# Patient Record
Sex: MALE | Race: OTHER | HISPANIC OR LATINO | URBAN - METROPOLITAN AREA
[De-identification: names, ages, dates, MRNs, and addresses within clinical notes are randomized per-mention and may not be internally consistent; named-entity substitution may affect disease eponyms.]

---

## 2024-04-18 ENCOUNTER — EMERGENCY (EMERGENCY)
Facility: HOSPITAL | Age: 24
LOS: 1 days | Discharge: HOME | End: 2024-04-18
Attending: STUDENT IN AN ORGANIZED HEALTH CARE EDUCATION/TRAINING PROGRAM | Admitting: STUDENT IN AN ORGANIZED HEALTH CARE EDUCATION/TRAINING PROGRAM
Payer: SELF-PAY

## 2024-04-18 VITALS
OXYGEN SATURATION: 97 % | RESPIRATION RATE: 17 BRPM | HEART RATE: 93 BPM | DIASTOLIC BLOOD PRESSURE: 74 MMHG | TEMPERATURE: 98 F | SYSTOLIC BLOOD PRESSURE: 129 MMHG

## 2024-04-18 DIAGNOSIS — M25.561 PAIN IN RIGHT KNEE: ICD-10-CM

## 2024-04-18 DIAGNOSIS — G89.11 ACUTE PAIN DUE TO TRAUMA: ICD-10-CM

## 2024-04-18 DIAGNOSIS — M25.511 PAIN IN RIGHT SHOULDER: ICD-10-CM

## 2024-04-18 DIAGNOSIS — M54.9 DORSALGIA, UNSPECIFIED: ICD-10-CM

## 2024-04-18 DIAGNOSIS — M79.671 PAIN IN RIGHT FOOT: ICD-10-CM
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
## 2024-04-18 PROCEDURE — 99283 EMERGENCY DEPT VISIT LOW MDM: CPT

## 2024-04-18 PROCEDURE — 99284 EMERGENCY DEPT VISIT MOD MDM: CPT
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
## 2024-04-18 NOTE — ED PROVIDER NOTE - NS ED ROS FT
Positive: Right shoulder pain, right knee pain, right foot pain   negative: Fever, chills, hemoptysis, chest pain, shortness of breath, nausea, vomiting, diarrhea, abdominal pain, dysuria, hematuria, rash, focal numbness or weakness, head trauma, LOC
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
## 2024-04-18 NOTE — ED PROVIDER NOTE - BIRTH SEX
The shortness of breath is probably more from asthma. Your heart test looked excellent in 2020.  Try adding a twice a day inhaler fluticasone, 1-2 sprays twice day. Take twice a day every day. You can still use the Xopenex inhaler here and there as needed.     Consider seeing ENT about tinnitus and hearing.     Diabetes looks great. Stop taking the diabetes pill (glipizide).     See me back in April 2023 for an Annual Wellness Visit.   
Male
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
## 2024-04-18 NOTE — ED PROVIDER NOTE - PHYSICAL EXAMINATION
GENERAL:  Awake, alert and in NAD, non-toxic appearing  ENMT: Airway patent, c-collar in place  EYES: conjunctiva clear, pupils 3 mm PERRL  CARDIAC: Regular rate, regular rhythm.  Heart sounds S1, S2, no S3, S4. No murmurs, rubs or gallops.  RESPIRATORY: Breath sounds clear and equal in bilateral anterior lung fields, no wheezes/ronchi/crackles/stridor; pt breathing and speaking comfortably with no increased WOB, no accessory mm. use, no intercostal retractions, no nasal flaring  GI: abdomen soft, non-distended, non-tender, no rebound or guarding in ruq/luq/rlq/llq/epigastrium  SKIN: warm and dry, no rashes/abrasions/lacerations  PSYCH: awake, alert, calm and cooperative  EXTREMITIES: no ble edema  BUE: 5/5 motor strength LUE deltoid, biceps, triceps, wrist flexors/extensors, hand . sensation intact to light touch bilat C5-T1; 2+ radial pulses bilat; compartment soft, skin warm and dry; limited ability to assess RUE strength 2/2 pain, movement limited 2/2 pain  BLE: 5/5 motor strength LLE hip flexors, knee flexors and extensors, plantar and dorsiflexors, hallux flexors and extensors. sensation intact to light touch bilat L3-S1; 2+ DP pulses bilat; compartment soft, skin warm and dry; movement of RLE and strength of RLE limited 2/2 pain  ttp over R foot dorsally, and generally over R knee and R shoulder. no deformities of R shoulder/knee/foot  NEURO: gcs 15  head: ncat, no raccoon eyes/gomes sign  spine: no midline c/t/l spinal ttp or stepoffs

## 2024-04-18 NOTE — ED PROVIDER NOTE - NSFOLLOWUPINSTRUCTIONS_ED_ALL_ED_FT
Please reach out to Monisha Marques ([facility] ED clinical referral coordinator) to assist you with your follow-up appointment.     Monday - Friday 11am-7pm  [phone]  [email or web address]     1. You were seen for shoulder and knee pain. A copy of any of your resulted labs, imaging, and findings have been provided to you. Make sure to view any test results that may not have yet resulted at the time of your discharge by creating a FollowMyHealth account at: [email or web address] to sign up for FollowMyHealth. Please review all of your lab, imaging, and all other results in their entirety with your primary care doctor.   2. Continue to take your home medications as prescribed. Take over the counter motrin 600 mg with food every six hours (do not exceed 3,200 mg in a 24 hour period) and supplement with tylenol 650 mg every six hours (do not exceed 4000 mg of tylenol in a 24 hour period and do not drink alcohol while taking tylenol) between the motrin dosages to have a layered effect. Consult a pharmacist or your primary care doctor with any questions.   3. Follow up with your primary care doctor within 48 hours to assess the symptoms you were seen for in the emergency department and to review all results from your visit. If you don't have a doctor, call [phone] to make an appointment.  4. Return immediately to the emergency department for new, persistent, or worsening symptoms or signs. Return immediately to the emergency department if you have chest pain, shortness of breath, loss of consciousness, numbness, weakness, or worsening pain.   5. For your for health, you should make healthy food choices and be physically active. Also, you should not smoke or use drugs, and you should not drink alcohol in excess. Please visit [email or web address] for resources and more information.

## 2024-04-18 NOTE — ED ADULT NURSE NOTE - NSFALLRISKINTERV_ED_ALL_ED
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
## 2024-04-18 NOTE — ED PROVIDER NOTE - PATIENT PORTAL LINK FT
You can access the FollowMyHealth Patient Portal offered by [facility] by registering at the following website: [email or web address]. By joining [employer] [facility]’s FollowMyHealth portal, you will also be able to view your health information using other applications (apps) compatible with our system.